# Patient Record
Sex: MALE | Race: WHITE
[De-identification: names, ages, dates, MRNs, and addresses within clinical notes are randomized per-mention and may not be internally consistent; named-entity substitution may affect disease eponyms.]

---

## 2020-02-11 ENCOUNTER — RX ONLY (OUTPATIENT)
Age: 30
Setting detail: RX ONLY
End: 2020-02-11

## 2020-02-11 ENCOUNTER — APPOINTMENT (RX ONLY)
Dept: URBAN - METROPOLITAN AREA CLINIC 141 | Facility: CLINIC | Age: 30
Setting detail: DERMATOLOGY
End: 2020-02-11

## 2020-02-11 DIAGNOSIS — L71.0 PERIORAL DERMATITIS: ICD-10-CM

## 2020-02-11 PROCEDURE — 99201: CPT

## 2020-02-11 PROCEDURE — ? TREATMENT REGIMEN

## 2020-02-11 PROCEDURE — ? PRESCRIPTION

## 2020-02-11 PROCEDURE — ? COUNSELING

## 2020-02-11 RX ORDER — PIMECROLIMUS 10 MG/G
CREAM TOPICAL BID
Qty: 1 | Refills: 3 | Status: ERX | COMMUNITY
Start: 2020-02-11

## 2020-02-11 RX ADMIN — PIMECROLIMUS: 10 CREAM TOPICAL at 00:00

## 2020-02-11 ASSESSMENT — LOCATION DETAILED DESCRIPTION DERM: LOCATION DETAILED: RIGHT SUPERIOR CENTRAL MALAR CHEEK

## 2020-02-11 ASSESSMENT — LOCATION ZONE DERM: LOCATION ZONE: FACE

## 2020-02-11 ASSESSMENT — LOCATION SIMPLE DESCRIPTION DERM: LOCATION SIMPLE: RIGHT CHEEK

## 2020-02-11 NOTE — HPI: RASH
Is This A New Presentation, Or A Follow-Up?: Rash
Additional History: HCC helped for a while, but the area re-flared.

## 2020-02-11 NOTE — PROCEDURE: TREATMENT REGIMEN
Detail Level: Detailed
Initiate Treatment: Keflex 250 mg 1 po bid x 10 days \\nElidel apply daily to rash

## 2020-02-20 ENCOUNTER — RX ONLY (OUTPATIENT)
Age: 30
Setting detail: RX ONLY
End: 2020-02-20

## 2020-03-02 ENCOUNTER — RX ONLY (OUTPATIENT)
Age: 30
Setting detail: RX ONLY
End: 2020-03-02

## 2020-03-02 RX ORDER — METRONIDAZOLE 7.5 MG/G
CREAM TOPICAL
Qty: 1 | Refills: 2 | Status: ERX | COMMUNITY
Start: 2020-03-02

## 2020-05-26 ENCOUNTER — APPOINTMENT (RX ONLY)
Dept: URBAN - METROPOLITAN AREA CLINIC 141 | Facility: CLINIC | Age: 30
Setting detail: DERMATOLOGY
End: 2020-05-26

## 2020-05-26 DIAGNOSIS — L72.0 EPIDERMAL CYST: ICD-10-CM

## 2020-05-26 DIAGNOSIS — L71.0 PERIORAL DERMATITIS: ICD-10-CM | Status: IMPROVED

## 2020-05-26 PROCEDURE — ? COUNSELING

## 2020-05-26 PROCEDURE — ? ACNE SURGERY

## 2020-05-26 PROCEDURE — ? TREATMENT REGIMEN

## 2020-05-26 PROCEDURE — ? PRESCRIPTION

## 2020-05-26 PROCEDURE — 99212 OFFICE O/P EST SF 10 MIN: CPT | Mod: 25

## 2020-05-26 PROCEDURE — 10040 EXTRACTION: CPT

## 2020-05-26 ASSESSMENT — LOCATION DETAILED DESCRIPTION DERM
LOCATION DETAILED: RIGHT CENTRAL MALAR CHEEK
LOCATION DETAILED: RIGHT SUPERIOR CENTRAL MALAR CHEEK

## 2020-05-26 ASSESSMENT — LOCATION ZONE DERM: LOCATION ZONE: FACE

## 2020-05-26 ASSESSMENT — LOCATION SIMPLE DESCRIPTION DERM: LOCATION SIMPLE: RIGHT CHEEK

## 2020-05-26 ASSESSMENT — INVESTIGATOR STATIC GLOBAL ASSESSMENT
IN YOUR EXPERIENCE, AMONG ALL PATIENTS YOU HAVE SEEN WITH THIS CONDITION, HOW SEVERE IS THIS PATIENT'S CONDITION?: MINIMAL

## 2020-05-26 NOTE — PROCEDURE: ACNE SURGERY
Hemostasis: Pressure
Render Number Of Lesions Treated: yes
Prep Text (Optional): Prior to removal the treatment areas were prepped in the usual fashion.
Extraction Method: 21 gauge needle
Acne Type: Comedonal Lesions
Detail Level: Detailed
Render Post-Care Instructions In Note?: no
Post-Care Instructions: I reviewed with the patient in detail post-care instructions. Patient is to keep the treatment areaas dry overnight, and then apply bacitracin twice daily until healed. Patient may apply hydrogen peroxide soaks to remove any crusting.
Consent: Verbal consent was obtained and risks were reviewed including but not limited to scarring, infection, bleeding, scabbing, incomplete removal, and allergy to anesthesia.